# Patient Record
Sex: FEMALE | Race: WHITE | NOT HISPANIC OR LATINO
[De-identification: names, ages, dates, MRNs, and addresses within clinical notes are randomized per-mention and may not be internally consistent; named-entity substitution may affect disease eponyms.]

---

## 2021-09-15 ENCOUNTER — NON-APPOINTMENT (OUTPATIENT)
Age: 36
End: 2021-09-15

## 2021-09-15 PROBLEM — Z00.00 ENCOUNTER FOR PREVENTIVE HEALTH EXAMINATION: Status: ACTIVE | Noted: 2021-09-15

## 2021-09-16 ENCOUNTER — APPOINTMENT (OUTPATIENT)
Dept: OTOLARYNGOLOGY | Facility: CLINIC | Age: 36
End: 2021-09-16
Payer: MEDICAID

## 2021-09-16 VITALS — TEMPERATURE: 97.9 F | WEIGHT: 130 LBS | HEIGHT: 62 IN | BODY MASS INDEX: 23.92 KG/M2

## 2021-09-16 DIAGNOSIS — Z82.49 FAMILY HISTORY OF ISCHEMIC HEART DISEASE AND OTHER DISEASES OF THE CIRCULATORY SYSTEM: ICD-10-CM

## 2021-09-16 DIAGNOSIS — R04.0 EPISTAXIS: ICD-10-CM

## 2021-09-16 DIAGNOSIS — Z83.3 FAMILY HISTORY OF DIABETES MELLITUS: ICD-10-CM

## 2021-09-16 DIAGNOSIS — J34.2 DEVIATED NASAL SEPTUM: ICD-10-CM

## 2021-09-16 DIAGNOSIS — Z78.9 OTHER SPECIFIED HEALTH STATUS: ICD-10-CM

## 2021-09-16 PROCEDURE — 31238 NSL/SINS NDSC SRG NSL HEMRRG: CPT

## 2021-09-16 PROCEDURE — 99204 OFFICE O/P NEW MOD 45 MIN: CPT | Mod: 25

## 2021-09-16 RX ORDER — MAGNESIUM OXIDE 241.3 MG/1000MG
400 TABLET ORAL
Qty: 30 | Refills: 0 | Status: ACTIVE | COMMUNITY
Start: 2021-08-18

## 2021-09-16 RX ORDER — ERGOCALCIFEROL 1.25 MG/1
1.25 MG CAPSULE, LIQUID FILLED ORAL
Qty: 4 | Refills: 0 | Status: ACTIVE | COMMUNITY
Start: 2021-07-14

## 2021-09-16 RX ORDER — VENLAFAXINE HYDROCHLORIDE 37.5 MG/1
37.5 CAPSULE, EXTENDED RELEASE ORAL
Qty: 30 | Refills: 0 | Status: ACTIVE | COMMUNITY
Start: 2021-08-18

## 2021-09-16 RX ORDER — ERYTHROMYCIN 5 MG/G
5 OINTMENT OPHTHALMIC
Qty: 4 | Refills: 0 | Status: ACTIVE | COMMUNITY
Start: 2021-09-09

## 2021-09-16 RX ORDER — AMOXICILLIN AND CLAVULANATE POTASSIUM 875; 125 MG/1; MG/1
875-125 TABLET, COATED ORAL
Qty: 14 | Refills: 0 | Status: ACTIVE | COMMUNITY
Start: 2021-09-03

## 2021-09-17 PROBLEM — J34.2 DNS (DEVIATED NASAL SEPTUM): Status: ACTIVE | Noted: 2021-09-17

## 2021-09-17 PROBLEM — R04.0 EPISTAXIS: Status: ACTIVE | Noted: 2021-09-17

## 2021-09-20 NOTE — HISTORY OF PRESENT ILLNESS
[de-identified] : Initial visit.\par Chief complaint is nose bleed 3 times a day for the last several weeks. These are left-sided. She does not have a history of bleeding dyscrasias.\par She did not have any trauma.\par She does not take blood thinning medication.

## 2021-09-20 NOTE — PROCEDURE
[FreeTextEntry1] : Endoscopic control of epistaxis. [FreeTextEntry3] : PROCEDURE- CAUTERIZATION OF EPISTAXIS\par \par Indication: Epistaxis\par Procedure: Cauterization of anterior epistaxis Endoscopic cauterization of epistaxis\par Surgeon: Dr. Nuñez\par Consent: Options for treatment were reviewed with the patient (including placement of packing).  The risks of the procedure were reviewed and include reaction to the anesthetic, bleeding, infection, scarring/cosmetic deformity, septal perforation, need for further procedures or packing. The patient wished to proceed.\par Anesthetic: Topical tetracaine and oxymetazoline.  Injection with 1% lidocaine with 1:100,000 epinephrine.\par \par Procedure: The patient was positioned comfortably. The nose was anesthetized with topical tetracaine and decongested with oxymetazoline. After an adequate amount of time, approximately    2cc 1% lidocaine with 1:100,000 epinephrine was injected into the septal mucosa near the source of bleeding.  A rigid endoscope was used. After the nose was anesthetized, cauterization of the bleeding source on the LEFT septum was performed using electric cautery.  Surgicel bacitracin was applied. The patient tolerated the procedure well. There were no complications.  \par   [FreeTextEntry2] : epistaxis [FreeTextEntry6] : PROCEDURE NOTES\par \par PROCEDURE: Nasal endoscopy \par SURGEON: Dr. Nuñez\par INDICATIONS: Assess for chronic sinusitis. \par ANESTHESIA: The patient was placed in a sitting position.  Following application of the topical anesthetic and decongestant, exam was performed with a zero degree endoscope.  The scope was passed along the right nasal floor to the nasopharynx.  It was then passed into the region of the middle meatus, middle turbinate, and sphenoethmoid region.  An identical procedure was performed on the left side.  The following findings were noted:\par \par The nasal mucosa was healthy appearing and the septum caudal septal deflection of the left. Small friable vessel at anterior septal arterial plexus. The middle meatus and sphenoethmoid recesses were clear bilaterally. The nasopharynx was normal. \par  \par

## 2021-10-14 ENCOUNTER — APPOINTMENT (OUTPATIENT)
Dept: OTOLARYNGOLOGY | Facility: CLINIC | Age: 36
End: 2021-10-14